# Patient Record
Sex: MALE | Race: WHITE | URBAN - METROPOLITAN AREA
[De-identification: names, ages, dates, MRNs, and addresses within clinical notes are randomized per-mention and may not be internally consistent; named-entity substitution may affect disease eponyms.]

---

## 2020-01-01 ENCOUNTER — HOSPITAL ENCOUNTER (EMERGENCY)
Age: 65
End: 2020-10-28
Attending: EMERGENCY MEDICINE
Payer: COMMERCIAL

## 2020-01-01 PROCEDURE — 6360000002 HC RX W HCPCS: Performed by: EMERGENCY MEDICINE

## 2020-01-01 PROCEDURE — 99285 EMERGENCY DEPT VISIT HI MDM: CPT

## 2020-01-01 PROCEDURE — 96374 THER/PROPH/DIAG INJ IV PUSH: CPT

## 2020-01-01 PROCEDURE — 92950 HEART/LUNG RESUSCITATION CPR: CPT

## 2020-01-01 PROCEDURE — 6360000002 HC RX W HCPCS

## 2020-01-01 PROCEDURE — 99283 EMERGENCY DEPT VISIT LOW MDM: CPT

## 2020-01-01 RX ORDER — EPINEPHRINE 0.1 MG/ML
1 SYRINGE (ML) INJECTION ONCE
Status: COMPLETED | OUTPATIENT
Start: 2020-01-01 | End: 2020-01-01

## 2020-01-01 RX ADMIN — Medication 1 MG: at 17:31

## 2020-10-28 NOTE — ED NOTES
Family into room, emotional support given.  Per Octavious patient is going to be a coroners case     Rosio Huffman RN  10/28/20 0578

## 2020-10-28 NOTE — ED NOTES
Pulse check - pt asystole, no pulse found, epi given at this time.  Pt connected to [id number] [facility],[address], RN  10/28/20 1509

## 2020-10-28 NOTE — ED NOTES
Pt arrived by EMS. Last epi given 3min ago. Pt found down by neighbor. Upon EMS arrival pt V fib, shocked once and arrested. CPR started.  Pt intubated by EMS 26 at the [address] [place] [address],Second Floor [facility]  10/28/20 2151

## 2020-10-28 NOTE — ED PROVIDER NOTES
28-year-old male brought in as a cardiac arrest.  He was found by his family laying in his yard. He had been previously out working in his yard. Neighbors indicate that they had seen him previously and he had waved at them. When family found him he was unresponsive, paramedics were called. They arrived to the scene at about 5 PM, their initial rhythm was asystole and patient showed no signs of breathing on his own and there was no heart rate. Unknown downtime prior to their arrival.  The patient was intubated prior to arrival, cardiac compressions were performed and epinephrine was given multiple times throughout the transportation to the ED. Upon arrival to the emergency department at [phone] patient was evaluated. Pressors are present in the right but not on the left. The endotracheal tube was pulled back and ultimately had improvement in lung sounds. Patient showed no signs of improvement, ACLS protocols were continued, [facility] device was started. Patient showed no improvement, no spontaneous movement, no spontaneous respirations, no heart beat. Ultrasound was performed at the bedside showing cardiac standstill, patient was pronounced . Timing: Sudden  Quality: Persistent  Severity: Severe  Duration: Unknown but greater than 35 minutes  Associated Signs/symptoms: Working in the yard  No family history on file. Past Surgical History:   Procedure Laterality Date    COLONOSCOPY         Review of Systems   Unable to perform ROS: Patient unresponsive        Physical Exam  Constitutional:       Comments: Unresponsive, no movements   HENT:      Head: Normocephalic and atraumatic.    Eyes:      Comments: Pupils are fixed and dilated and unresponsive   Neck:      Comments: No movement, no pulse felt  Cardiovascular:      Comments: No heart rate present  Pulmonary:      Comments: Generally clear breath sounds, with bagging there are breath sounds to the right, diminished to the left, after moving the tube there are improved breath sounds to the left  Abdominal:      General: There is no distension. Musculoskeletal:      Comments: No movements   Skin:     Comments: Mottled skin, cool to the touch extremities   Neurological:      Mental Status: He is unresponsive. Comments: No movement          Procedures     [facility]              --------------------------------------------- PAST HISTORY ---------------------------------------------  Past Medical History:  has a past medical history of Heart murmur, Mild aortic regurgitation, and Sinus bradycardia on ECG. Past Surgical History:  has a past surgical history that includes Colonoscopy. Social History:  reports that he has never smoked. He has never used smokeless tobacco. He reports that he does not drink alcohol or use drugs. Family History: family history is not on file. The patients home medications have been reviewed. Allergies: Amoxicillin    -------------------------------------------------- RESULTS -------------------------------------------------    Lab  No results found for this visit on 10/28/20. Radiology  No orders to display       ------------------------- NURSING NOTES AND VITALS REVIEWED ---------------------------  Date / Time Roomed:  10/28/2020  5:31 PM  ED Bed Assignment:  04/04    The nursing notes within the ED encounter and vital signs as below have been reviewed. No data found. Oxygen Saturation Interpretation: Abnormal      --------------------------------- ADDITIONAL PROVIDER NOTES ---------------------------------  Consultations:  Spoke with Dr. Marifer Solomon,  They ask that the patient be a  patient.     This patient's ED course included: a personal history and physicial examination, re-evaluation prior to disposition, multiple bedside re-evaluations, IV medications, cardiac monitoring, continuous pulse oximetry and complex medical decision making and emergency management    This patient has